# Patient Record
Sex: MALE | Race: WHITE | Employment: UNEMPLOYED | URBAN - METROPOLITAN AREA
[De-identification: names, ages, dates, MRNs, and addresses within clinical notes are randomized per-mention and may not be internally consistent; named-entity substitution may affect disease eponyms.]

---

## 2021-08-20 ENCOUNTER — OFFICE VISIT (OUTPATIENT)
Dept: FAMILY MEDICINE CLINIC | Facility: CLINIC | Age: 21
End: 2021-08-20
Payer: COMMERCIAL

## 2021-08-20 VITALS
SYSTOLIC BLOOD PRESSURE: 118 MMHG | DIASTOLIC BLOOD PRESSURE: 86 MMHG | HEIGHT: 64 IN | HEART RATE: 78 BPM | BODY MASS INDEX: 31.92 KG/M2 | WEIGHT: 187 LBS

## 2021-08-20 DIAGNOSIS — Z00.00 PHYSICAL EXAM: Primary | ICD-10-CM

## 2021-08-20 PROCEDURE — 3008F BODY MASS INDEX DOCD: CPT | Performed by: FAMILY MEDICINE

## 2021-08-20 PROCEDURE — 3079F DIAST BP 80-89 MM HG: CPT | Performed by: FAMILY MEDICINE

## 2021-08-20 PROCEDURE — 90715 TDAP VACCINE 7 YRS/> IM: CPT | Performed by: FAMILY MEDICINE

## 2021-08-20 PROCEDURE — 99385 PREV VISIT NEW AGE 18-39: CPT | Performed by: FAMILY MEDICINE

## 2021-08-20 PROCEDURE — 90471 IMMUNIZATION ADMIN: CPT | Performed by: FAMILY MEDICINE

## 2021-08-20 PROCEDURE — 3074F SYST BP LT 130 MM HG: CPT | Performed by: FAMILY MEDICINE

## 2021-08-20 NOTE — PROGRESS NOTES
8/20/2021  12:12 PM    Chanda Andujar is a 19year old male. Chief complaint(s): Patient presents with: Annual    HPI:     Chanda Andujar primary complaint is regarding CPE.        Chanda Andujar is a 19year old male is here for routine periodic health sc Patient discharged by [facility], [place]. Patient provided with discharge instructions Rx and instructions on follow up care. Patient out of ED ambulatory accompanied by self. change, fatigue and fever. HENT: Negative for hearing loss and nosebleeds. Eyes: Negative for pain and visual disturbance. Respiratory: Negative for apnea and shortness of breath.     Cardiovascular: Negative for chest pain, palpitations and leg swel Hernia: No hernia is present. Musculoskeletal:      Cervical back: Neck supple. Comments: Spinal exam without scoliosis. Lymphadenopathy:      Cervical: No cervical adenopathy. Skin:     General: Skin is warm. Findings: No rash.    Yovany File cardiovascular exercise. Encourage to maintain the best physical and dental hygiene possible.   Consider a  if overweight and/or having difficult in staying active; attempt to keep a schedule that includes an adequate sleep and physical exer

## 2021-09-10 ENCOUNTER — LAB ENCOUNTER (OUTPATIENT)
Dept: LAB | Facility: HOSPITAL | Age: 21
End: 2021-09-10
Attending: FAMILY MEDICINE
Payer: COMMERCIAL

## 2021-09-10 DIAGNOSIS — Z00.00 PHYSICAL EXAM: ICD-10-CM

## 2021-09-10 LAB
ALBUMIN SERPL-MCNC: 4.2 G/DL (ref 3.4–5)
ALBUMIN/GLOB SERPL: 1 {RATIO} (ref 1–2)
ALP LIVER SERPL-CCNC: 87 U/L
ALT SERPL-CCNC: 144 U/L
ANION GAP SERPL CALC-SCNC: 6 MMOL/L (ref 0–18)
AST SERPL-CCNC: 55 U/L (ref 15–37)
BASOPHILS # BLD AUTO: 0.04 X10(3) UL (ref 0–0.2)
BASOPHILS NFR BLD AUTO: 0.6 %
BILIRUB SERPL-MCNC: 0.6 MG/DL (ref 0.1–2)
BILIRUB UR QL: NEGATIVE
BUN BLD-MCNC: 14 MG/DL (ref 7–18)
BUN/CREAT SERPL: 23.7 (ref 10–20)
CALCIUM BLD-MCNC: 9.4 MG/DL (ref 8.5–10.1)
CHLORIDE SERPL-SCNC: 105 MMOL/L (ref 98–112)
CHOLEST SMN-MCNC: 215 MG/DL (ref ?–200)
CLARITY UR: CLEAR
CO2 SERPL-SCNC: 27 MMOL/L (ref 21–32)
COLOR UR: YELLOW
CREAT BLD-MCNC: 0.59 MG/DL
DEPRECATED RDW RBC AUTO: 41.3 FL (ref 35.1–46.3)
EOSINOPHIL # BLD AUTO: 0.12 X10(3) UL (ref 0–0.7)
EOSINOPHIL NFR BLD AUTO: 1.8 %
ERYTHROCYTE [DISTWIDTH] IN BLOOD BY AUTOMATED COUNT: 12.8 % (ref 11–15)
EST. AVERAGE GLUCOSE BLD GHB EST-MCNC: 131 MG/DL (ref 68–126)
GLOBULIN PLAS-MCNC: 4.2 G/DL (ref 2.8–4.4)
GLUCOSE BLD-MCNC: 87 MG/DL (ref 70–99)
GLUCOSE UR-MCNC: NEGATIVE MG/DL
HBA1C MFR BLD HPLC: 6.2 % (ref ?–5.7)
HCT VFR BLD AUTO: 47.8 %
HDLC SERPL-MCNC: 38 MG/DL (ref 40–59)
HGB BLD-MCNC: 16.3 G/DL
HGB UR QL STRIP.AUTO: NEGATIVE
IMM GRANULOCYTES # BLD AUTO: 0.02 X10(3) UL (ref 0–1)
IMM GRANULOCYTES NFR BLD: 0.3 %
KETONES UR-MCNC: NEGATIVE MG/DL
LDLC SERPL CALC-MCNC: 139 MG/DL (ref ?–100)
LEUKOCYTE ESTERASE UR QL STRIP.AUTO: NEGATIVE
LYMPHOCYTES # BLD AUTO: 1.87 X10(3) UL (ref 1–4)
LYMPHOCYTES NFR BLD AUTO: 27.3 %
M PROTEIN MFR SERPL ELPH: 8.4 G/DL (ref 6.4–8.2)
MCH RBC QN AUTO: 30 PG (ref 26–34)
MCHC RBC AUTO-ENTMCNC: 34.1 G/DL (ref 31–37)
MCV RBC AUTO: 88 FL
MONOCYTES # BLD AUTO: 0.72 X10(3) UL (ref 0.1–1)
MONOCYTES NFR BLD AUTO: 10.5 %
NEUTROPHILS # BLD AUTO: 4.08 X10 (3) UL (ref 1.5–7.7)
NEUTROPHILS # BLD AUTO: 4.08 X10(3) UL (ref 1.5–7.7)
NEUTROPHILS NFR BLD AUTO: 59.5 %
NITRITE UR QL STRIP.AUTO: NEGATIVE
NONHDLC SERPL-MCNC: 177 MG/DL (ref ?–130)
OSMOLALITY SERPL CALC.SUM OF ELEC: 286 MOSM/KG (ref 275–295)
PATIENT FASTING Y/N/NP: YES
PATIENT FASTING Y/N/NP: YES
PH UR: 6 [PH] (ref 5–8)
PLATELET # BLD AUTO: 200 10(3)UL (ref 150–450)
POTASSIUM SERPL-SCNC: 4 MMOL/L (ref 3.5–5.1)
PROT UR-MCNC: NEGATIVE MG/DL
RBC # BLD AUTO: 5.43 X10(6)UL
SODIUM SERPL-SCNC: 138 MMOL/L (ref 136–145)
SP GR UR STRIP: 1.02 (ref 1–1.03)
TRIGL SERPL-MCNC: 208 MG/DL (ref 30–149)
TSI SER-ACNC: 1.04 MIU/ML (ref 0.36–3.74)
UROBILINOGEN UR STRIP-ACNC: <2
VIT D+METAB SERPL-MCNC: 12.9 NG/ML (ref 30–100)
VLDLC SERPL CALC-MCNC: 39 MG/DL (ref 0–30)
WBC # BLD AUTO: 6.9 X10(3) UL (ref 4–11)

## 2021-09-10 PROCEDURE — 81015 MICROSCOPIC EXAM OF URINE: CPT | Performed by: FAMILY MEDICINE

## 2021-09-10 PROCEDURE — 81001 URINALYSIS AUTO W/SCOPE: CPT | Performed by: FAMILY MEDICINE

## 2021-09-10 PROCEDURE — 83036 HEMOGLOBIN GLYCOSYLATED A1C: CPT

## 2021-09-10 PROCEDURE — 85025 COMPLETE CBC W/AUTO DIFF WBC: CPT

## 2021-09-10 PROCEDURE — 84443 ASSAY THYROID STIM HORMONE: CPT

## 2021-09-10 PROCEDURE — 82306 VITAMIN D 25 HYDROXY: CPT | Performed by: FAMILY MEDICINE

## 2021-09-10 PROCEDURE — 80053 COMPREHEN METABOLIC PANEL: CPT

## 2021-09-10 PROCEDURE — 80061 LIPID PANEL: CPT

## 2021-09-10 PROCEDURE — 36415 COLL VENOUS BLD VENIPUNCTURE: CPT

## 2021-09-10 RX ORDER — ERGOCALCIFEROL 1.25 MG/1
50000 CAPSULE ORAL WEEKLY
Qty: 12 CAPSULE | Refills: 4 | Status: SHIPPED | OUTPATIENT
Start: 2021-09-10 | End: 2021-10-10

## 2022-01-04 ENCOUNTER — OFFICE VISIT (OUTPATIENT)
Dept: FAMILY MEDICINE CLINIC | Facility: CLINIC | Age: 22
End: 2022-01-04
Payer: COMMERCIAL

## 2022-01-04 VITALS
BODY MASS INDEX: 32.78 KG/M2 | SYSTOLIC BLOOD PRESSURE: 130 MMHG | HEART RATE: 77 BPM | WEIGHT: 192 LBS | DIASTOLIC BLOOD PRESSURE: 80 MMHG | HEIGHT: 64 IN

## 2022-01-04 DIAGNOSIS — R04.0 EPISTAXIS: Primary | ICD-10-CM

## 2022-01-04 PROCEDURE — 99213 OFFICE O/P EST LOW 20 MIN: CPT | Performed by: NURSE PRACTITIONER

## 2022-01-04 PROCEDURE — 3075F SYST BP GE 130 - 139MM HG: CPT | Performed by: NURSE PRACTITIONER

## 2022-01-04 PROCEDURE — 3008F BODY MASS INDEX DOCD: CPT | Performed by: NURSE PRACTITIONER

## 2022-01-04 PROCEDURE — 3079F DIAST BP 80-89 MM HG: CPT | Performed by: NURSE PRACTITIONER

## 2022-01-04 RX ORDER — ECHINACEA PURPUREA EXTRACT 125 MG
1 TABLET ORAL AS NEEDED
Qty: 60 ML | Refills: 2 | Status: SHIPPED | OUTPATIENT
Start: 2022-01-04

## 2022-01-04 NOTE — PATIENT INSTRUCTIONS
Nosebleed (Adult)    Bleeding from the nose most commonly occurs because of injury or drying and cracking of the inner lining of the nose. Most nosebleeds are because of dry air or nose-picking. They can occur during a common cold or an allergy attack. Pinch your nose tightly on both sides, as shown above, for 10 to 15 minutes. Time yourself. Don’t release the pressure on your nose until 10 minutes is up.  If bleeding doesn't stop, continue to pinch your nose and, if the bleeding is a small amount, call y

## 2022-01-04 NOTE — PROGRESS NOTES
HPI    Patient presents for concerns of recurrent nose bleeds x 2 weeks. Patient is not using any nose sprays. Review of Systems   HENT: Positive for nosebleeds.           01/04/22  1346   BP: 130/80   Pulse: 77   Weight: 192 lb (87.1 kg)   Height is not in acute distress. Appearance: Normal appearance. He is not ill-appearing, toxic-appearing or diaphoretic. HENT:      Head: Normocephalic and atraumatic.       Right Ear: Tympanic membrane, ear canal and external ear normal. There is no impacte

## 2025-02-05 ENCOUNTER — LAB ENCOUNTER (OUTPATIENT)
Dept: LAB | Age: 25
End: 2025-02-05
Attending: NURSE PRACTITIONER
Payer: COMMERCIAL

## 2025-02-05 ENCOUNTER — OFFICE VISIT (OUTPATIENT)
Dept: FAMILY MEDICINE CLINIC | Facility: CLINIC | Age: 25
End: 2025-02-05

## 2025-02-05 VITALS
WEIGHT: 210.31 LBS | HEART RATE: 79 BPM | TEMPERATURE: 97 F | SYSTOLIC BLOOD PRESSURE: 134 MMHG | HEIGHT: 65 IN | DIASTOLIC BLOOD PRESSURE: 88 MMHG | BODY MASS INDEX: 35.04 KG/M2

## 2025-02-05 DIAGNOSIS — R74.8 ELEVATED LIVER ENZYMES: ICD-10-CM

## 2025-02-05 DIAGNOSIS — Z83.3 FAMILY HISTORY OF DIABETES MELLITUS IN FATHER: ICD-10-CM

## 2025-02-05 DIAGNOSIS — R73.03 PRE-DIABETES: ICD-10-CM

## 2025-02-05 DIAGNOSIS — E78.2 MIXED HYPERLIPIDEMIA: ICD-10-CM

## 2025-02-05 DIAGNOSIS — Z00.00 WELL ADULT EXAM: Primary | ICD-10-CM

## 2025-02-05 DIAGNOSIS — Z23 IMMUNIZATION DUE: ICD-10-CM

## 2025-02-05 DIAGNOSIS — R03.0 ELEVATED BLOOD-PRESSURE READING WITHOUT DIAGNOSIS OF HYPERTENSION: ICD-10-CM

## 2025-02-05 DIAGNOSIS — K21.9 GASTROESOPHAGEAL REFLUX DISEASE WITHOUT ESOPHAGITIS: ICD-10-CM

## 2025-02-05 DIAGNOSIS — E66.9 OBESITY (BMI 30-39.9): ICD-10-CM

## 2025-02-05 DIAGNOSIS — Z00.00 WELL ADULT EXAM: ICD-10-CM

## 2025-02-05 LAB
ALBUMIN SERPL-MCNC: 5.3 G/DL (ref 3.2–4.8)
ALBUMIN/GLOB SERPL: 1.7 {RATIO} (ref 1–2)
ALP LIVER SERPL-CCNC: 109 U/L
ALT SERPL-CCNC: 189 U/L
ANION GAP SERPL CALC-SCNC: 10 MMOL/L (ref 0–18)
AST SERPL-CCNC: 73 U/L (ref ?–34)
BILIRUB SERPL-MCNC: 0.3 MG/DL (ref 0.3–1.2)
BUN BLD-MCNC: 11 MG/DL (ref 9–23)
BUN/CREAT SERPL: 15.3 (ref 10–20)
CALCIUM BLD-MCNC: 9.8 MG/DL (ref 8.7–10.4)
CHLORIDE SERPL-SCNC: 101 MMOL/L (ref 98–112)
CHOLEST SERPL-MCNC: 211 MG/DL (ref ?–200)
CO2 SERPL-SCNC: 26 MMOL/L (ref 21–32)
CREAT BLD-MCNC: 0.72 MG/DL
CREAT UR-SCNC: 58.7 MG/DL
DEPRECATED RDW RBC AUTO: 40 FL (ref 35.1–46.3)
EGFRCR SERPLBLD CKD-EPI 2021: 129 ML/MIN/1.73M2 (ref 60–?)
ERYTHROCYTE [DISTWIDTH] IN BLOOD BY AUTOMATED COUNT: 12.5 % (ref 11–15)
EST. AVERAGE GLUCOSE BLD GHB EST-MCNC: 148 MG/DL (ref 68–126)
FASTING PATIENT LIPID ANSWER: YES
FASTING STATUS PATIENT QL REPORTED: YES
GLOBULIN PLAS-MCNC: 3.1 G/DL (ref 2–3.5)
GLUCOSE BLD-MCNC: 105 MG/DL (ref 70–99)
HBA1C MFR BLD: 6.8 % (ref ?–5.7)
HCT VFR BLD AUTO: 49.3 %
HDLC SERPL-MCNC: 30 MG/DL (ref 40–59)
HGB BLD-MCNC: 17 G/DL
INSULIN SERPL-ACNC: 38.8 MU/L (ref 3–25)
LDLC SERPL CALC-MCNC: 97 MG/DL (ref ?–100)
MCH RBC QN AUTO: 30.2 PG (ref 26–34)
MCHC RBC AUTO-ENTMCNC: 34.5 G/DL (ref 31–37)
MCV RBC AUTO: 87.6 FL
MICROALBUMIN UR-MCNC: 4.5 MG/DL
MICROALBUMIN/CREAT 24H UR-RTO: 76.7 UG/MG (ref ?–30)
NONHDLC SERPL-MCNC: 181 MG/DL (ref ?–130)
OSMOLALITY SERPL CALC.SUM OF ELEC: 284 MOSM/KG (ref 275–295)
PLATELET # BLD AUTO: 215 10(3)UL (ref 150–450)
POTASSIUM SERPL-SCNC: 4.1 MMOL/L (ref 3.5–5.1)
PROT SERPL-MCNC: 8.4 G/DL (ref 5.7–8.2)
RBC # BLD AUTO: 5.63 X10(6)UL
SODIUM SERPL-SCNC: 137 MMOL/L (ref 136–145)
TRIGL SERPL-MCNC: 500 MG/DL (ref 30–149)
TSI SER-ACNC: 1.73 UIU/ML (ref 0.55–4.78)
VIT B12 SERPL-MCNC: 803 PG/ML (ref 211–911)
VIT D+METAB SERPL-MCNC: 6.6 NG/ML (ref 30–100)
VLDLC SERPL CALC-MCNC: 85 MG/DL (ref 0–30)
WBC # BLD AUTO: 7.5 X10(3) UL (ref 4–11)

## 2025-02-05 PROCEDURE — 82607 VITAMIN B-12: CPT

## 2025-02-05 PROCEDURE — 36415 COLL VENOUS BLD VENIPUNCTURE: CPT

## 2025-02-05 PROCEDURE — 82306 VITAMIN D 25 HYDROXY: CPT

## 2025-02-05 PROCEDURE — 85027 COMPLETE CBC AUTOMATED: CPT

## 2025-02-05 PROCEDURE — 82570 ASSAY OF URINE CREATININE: CPT

## 2025-02-05 PROCEDURE — 82043 UR ALBUMIN QUANTITATIVE: CPT

## 2025-02-05 PROCEDURE — 80061 LIPID PANEL: CPT

## 2025-02-05 PROCEDURE — 80053 COMPREHEN METABOLIC PANEL: CPT

## 2025-02-05 PROCEDURE — 83036 HEMOGLOBIN GLYCOSYLATED A1C: CPT

## 2025-02-05 PROCEDURE — 84443 ASSAY THYROID STIM HORMONE: CPT

## 2025-02-05 PROCEDURE — 83525 ASSAY OF INSULIN: CPT

## 2025-02-05 RX ORDER — GUAIFENESIN 600 MG/1
600 TABLET, EXTENDED RELEASE ORAL
COMMUNITY
Start: 2025-01-31

## 2025-02-05 RX ORDER — OMEPRAZOLE 40 MG/1
40 CAPSULE, DELAYED RELEASE ORAL DAILY
Qty: 90 CAPSULE | Refills: 1 | Status: SHIPPED | OUTPATIENT
Start: 2025-02-05

## 2025-02-05 RX ORDER — OSELTAMIVIR PHOSPHATE 75 MG/1
75 CAPSULE ORAL 2 TIMES DAILY
COMMUNITY
Start: 2025-01-31

## 2025-02-05 NOTE — ASSESSMENT & PLAN NOTE
OT ACUTE Treatment Session    Pt seen on 10 nursing unit.                                                          Frequency Comments: [facility]     Admitting complaint:: Stroke (cerebrum) (CMS/[facility]) [I63.9]                                                                                         Precautions  Other Precautions: fall risk (07/06/20 0801)  Precautions Comments: R hemiparesis, hypertonicity (07/03/20 0947)      ASSESSMENT:  Treatment today focused on functional mobility, RUE neuro re-education.  Current functional mobility for ADL and Instrumental-ADL tasks is min A for sit<>stand and short side stepping to right side.  Patient  displays  fair progress demonstrated by increased participation.   Limitations at this time include weakness, fatigue, cognitive deficits, balance, pain, medical status, carryover of techniques taught from one session to the next and RUE hypertonicity. Patient will benefit from further skilled OT for continued training with noted deficits to help the patient meet goal of noted deficits.  Discussed patient goal of decreased pain, discharge planning options and therapy progress made to date with Patient.       RECOMMENDATIONS FOR DISCHARGE:  Recommendations for Discharge: OT WI: Post acute therapy (07/08/20 1255)    OT Identified Barriers to Discharge: ADL dysfunction, impaired balance and mobility, impaired cognition, R sided hypertonicity     PT/OT Mobility Equipment for Discharge: Will continue to monitor needs; unsure of which devices pt has. (07/06/20 0801)  PT/OT ADL Equipment for Discharge: tbd (07/08/20 1255)    Assistance needed when returning home:   Not discussed at this time due to discharge plan/needs not established.  Will continue to address as hospital stay progresses.     ICU Mobility Assesment (PERME):         PLAN: Continue skilled OT, including the following Treatment Interventions: ADL retraining;Functional transfer training;UE strengthening/ROM;Endurance  Recheck cmp  Please aim to eat a diet high in fresh fruits and vegetables, lean protein sources, complex carbohydrates and limited processed and fast foods.  Try to get at least 150 minutes of exercise per week-a combination of weight resistance and cardio is preferred.    Pt denies alcohol use   training;Cognitive reorientation;Patient/Family training;Equipment eval/education;Neuro muscular reeducation;Fine motor coordination activities;Compensatory technique education (07/06/20 1425)   Treatment Plan for Next Session: UE bathing seated, use of lianna cane with additional assist for mobility  Additional Plan Considerations: Pt. reports having sling and splint at home but does not like to wear       EDUCATION:   On this date, the patient was educated on RUE stretching, pain management strategies.    The response to education was: Needs reinforcement                                                    SUBJECTIVE:     Subjective: Pt agreeable to session with gentle encouragement. Continued emotional lability throughout session.  (07/08/20 1255)  Subjective/Objective Comments: RN ok'd session. End of session Pt in bed with alarm activated and needs met. Call light within reach.  (07/08/20 1255)    OBJECTIVE:Safety Measures: Alarms(bed alarm zone) (07/08/20 1255)    RN reported [place] Fall Scale Score: 85       Last 24 hours of Functional Data     ADLs       Household mobility  Household Mobility  Supine to Sit: Supervision (07/08/20 1255)  Sit to Supine: Supervision (07/08/20 1255)  Sit to Stand: Minimal Assist (Min) (07/08/20 1255)  Stand to Sit: Minimal Assist (Min) (07/08/20 1255)  Transfer Equipment: gait belt, 2ww (07/08/20 1255)  Sitting - Static: Supervision (07/08/20 1255)  Sitting - Dynamic: Touching/Steadying Assistance (07/08/20 1255)  Standing - Static: Minimal Assist (Min) (07/08/20 1255)  Household Mobility Comments #1: Pt able to side step towards HOB with min A. Cues to keep eyes open with ambulation as Pt crying throughout activity.  (07/08/20 1255)    Home Management       Other Interventions  Other Interventions 1: Unsupported sitting: scapular mobilization exercises with 1 set x6-10 reps (varied based on Pt's emotional lability)/ Sustained Passive stretch to Right wrist extensors, finger  extensors, elbow extensors, shoulder external rotators and pronators/supinators with gentle AAROM. No skin breakdown noted in Right palm. Replaced towel roll with CNA education on purpose of placement and skin checks.  (07/08/20 1255)      Tolerance  OT Activity Tolerance  Activity Tolerance: 1:1 Activity to rest (07/08/20 1255)  Activity Tolerance Comments: fair, extra time needed for emotional status vs. activity tolerance (07/08/20 1255)    Cognition       Patient's Personal Goal: none stated (07/05/20 1601)    Therapy Goals:    Goals  Short Term Goals to Be Reviewed On: 07/10/20 (07/03/20 0947)  Short Term Goals Are The Same as Discharge Goals: Yes (07/03/20 0947)  Goal Agreement: Patient agrees with goals and treatment plan (07/03/20 0947)  Grooming Discharge Goal 1: three grooming tasks supervision (07/03/20 0947)  Bathing Discharge Goal 1: full body bathing supervision (07/03/20 0947)  Dressing Short Term Goal 1: full body dressing supervision (07/03/20 0947)  Toileting Discharge Goal 1: toilet supervision (07/03/20 0947)  Home Setting Transfer Short Term Goal 2: toilet transfer supervision (07/03/20 0947)  Home Management Discharge Goal 1: tub transfer supervision (07/03/20 0947)        Total Treatment Time:  OT Time Spent: 45 minutes (07/08/20 1255)    See OT flowsheet for full details regarding the OT therapy provided.

## 2025-02-05 NOTE — ASSESSMENT & PLAN NOTE
I recommend that you try/continue to decrease the amount of carbohydrates that you ingest (bread products, rice, pasta, potatoes, cereals, starchy vegetables and high sugar containing foods and  beverages). Also try to increase the amount of vegetables and protein that you eat daily.  Decrease the amount of processed and fast foods. Try to exercise at least 30 minutes per day, 4-5 times per week, combination of cardio and weight training.     Encourage weight loss

## 2025-02-05 NOTE — ASSESSMENT & PLAN NOTE
Check diabetic labs  I recommend that you try/continue to decrease the amount of carbohydrates that you ingest (bread products, rice, pasta, potatoes, cereals, starchy vegetables and high sugar containing foods and  beverages). Also try to increase the amount of vegetables and protein that you eat daily.  Decrease the amount of processed and fast foods. Try to exercise at least 30 minutes per day, 4-5 times per week, combination of cardio and weight training.

## 2025-02-05 NOTE — PROGRESS NOTES
HPI  Pt presents for annual physical .   Notices problems w breathing after eating alondra sweet, spicy or acidic foods. Notices some reflux.     Has not had physical  since 2021-hga1c 6.2 at that time    Diet-fair-tries to eat a salad at work daily  Exercise-walks 3-4 miles a day at work.   Sleep-well rested      Family medical [id number], father- dm and cholesterol    No smoking or vaping, alcohol or cannabis.     Review of Systems   Constitutional:  Negative for activity change, appetite change and fever.   HENT:  Negative for congestion, ear pain, rhinorrhea, sore throat and trouble swallowing.    Eyes:  Negative for pain, redness and visual disturbance.   Respiratory:  Positive for shortness of breath. Negative for cough, chest tightness and wheezing.    Cardiovascular:  Negative for chest pain and palpitations.   Gastrointestinal:  Negative for abdominal distention, abdominal pain, constipation, diarrhea, nausea and vomiting.        + reflux after eating   Genitourinary:  Negative for difficulty urinating, dysuria and frequency.   Musculoskeletal:  Negative for back pain, gait problem and myalgias.   Skin:  Negative for color change and rash.   Neurological:  Negative for dizziness, weakness and headaches.   Psychiatric/Behavioral:  Negative for behavioral problems and dysphoric mood. The patient is not nervous/anxious.        Vitals:    02/05/25 0928 02/05/25 0951   BP: (!) 137/92 134/88   Pulse: 79    Temp: 97 °F (36.1 °C)    Weight: 210 lb 4.8 oz (95.4 kg)    Height: 5' 5\" (1.651 m)      Body mass index is 35 kg/m².  Wt Readings from Last 6 Encounters:   02/05/25 210 lb 4.8 oz (95.4 kg)   01/04/22 192 lb (87.1 kg)   08/20/21 187 lb (84.8 kg)     BP Readings from Last 5 Encounters:   02/05/25 134/88   01/04/22 130/80   08/20/21 118/86         Health Maintenance   Topic Date Due    Annual Physical  Never done    HPV Vaccines (2 - Male 3-dose series) 03/14/2014    COVID-19 Vaccine (3 - 2024-25 season)  09/01/2024    Influenza Vaccine (1) 10/01/2024    Annual Depression Screening  Never done    DTaP,Tdap,and Td Vaccines (3 - Td or Tdap) 08/20/2031    Pneumococcal Vaccine: Birth to 50yrs  Aged Out    Meningococcal B Vaccine  Aged Out       History reviewed. No pertinent past medical history.    .History reviewed. No pertinent surgical history.    Family History   Problem Relation Age of Onset    Lipids Father     No Known Problems Mother        Social History     Socioeconomic History    Marital status: Single     Spouse name: Not on file    Number of children: Not on file    Years of education: Not on file    Highest education level: Not on file   Occupational History    Not on file   Tobacco Use    Smoking status: Never    Smokeless tobacco: Never   Substance and Sexual Activity    Alcohol use: Never    Drug use: Never    Sexual activity: Not on file   Other Topics Concern    Not on file   Social History Narrative    Not on file     Social Drivers of Health     Food Insecurity: No Food Insecurity (2/5/2025)    NCSS - Food Insecurity     Worried About Running Out of Food in the Last Year: No     Ran Out of Food in the Last Year: No   Transportation Needs: No Transportation Needs (2/5/2025)    NCSS - Transportation     Lack of Transportation: No   Stress: Not on file   Housing Stability: Not At Risk (2/5/2025)    NCSS - Housing/Utilities     Has Housing: Yes     Worried About Losing Housing: No     Unable to Get Utilities: No       Current Outpatient Medications   Medication Sig Dispense Refill    guaiFENesin  MG Oral Tablet 12 Hr Take 1 tablet (600 mg total) by mouth.      oseltamivir 75 MG Oral Cap Take 1 capsule (75 mg total) by mouth 2 (two) times daily.      Omeprazole 40 MG Oral Capsule Delayed Release Take 1 capsule (40 mg total) by mouth daily. 90 capsule 1    sodium chloride 0.65 % Nasal Solution 1 spray by Nasal route as needed for congestion. (Patient not taking: Reported on 2/5/2025) 60 mL 2        Allergies:  Allergies[1]    Physical Exam  Vitals and nursing note reviewed.   Constitutional:       General: He is not in acute distress.     Appearance: Normal appearance. He is well-developed. He is obese.   HENT:      Head: Normocephalic and atraumatic.      Right Ear: Tympanic membrane, ear canal and external ear normal.      Left Ear: Tympanic membrane, ear canal and external ear normal.      Nose: Nose normal. No congestion or rhinorrhea.      Mouth/Throat:      Mouth: Mucous membranes are moist.      Pharynx: Oropharynx is clear. No oropharyngeal exudate or posterior oropharyngeal erythema.   Eyes:      General:         Right eye: No discharge.         Left eye: No discharge.      Conjunctiva/sclera: Conjunctivae normal.      Pupils: Pupils are equal, round, and reactive to light.   Neck:      Thyroid: No thyromegaly.   Cardiovascular:      Rate and Rhythm: Normal rate and regular rhythm.      Heart sounds: Normal heart sounds. No murmur heard.  Pulmonary:      Effort: Pulmonary effort is normal. No respiratory distress.      Breath sounds: Normal breath sounds. No wheezing or rales.   Chest:      Chest wall: No tenderness.   Abdominal:      General: Bowel sounds are normal. There is no distension.      Palpations: Abdomen is soft.      Tenderness: There is no abdominal tenderness.   Musculoskeletal:         General: No tenderness. Normal range of motion.      Cervical back: Normal range of motion and neck supple.   Lymphadenopathy:      Cervical: No cervical adenopathy.   Skin:     General: Skin is warm and dry.      Findings: No rash.   Neurological:      Mental Status: He is alert and oriented to person, place, and time.      Coordination: Coordination normal.      Gait: Gait normal.   Psychiatric:         Behavior: Behavior normal.         Thought Content: Thought content normal.         Judgment: Judgment normal.         Assessment and Plan:   Problem List Items Addressed This Visit       Elevated  blood-pressure reading without diagnosis of hypertension     Will monitor blood pressure   Check labs  Please aim to eat a diet high in fresh fruits and vegetables, lean protein sources, complex carbohydrates and limited processed and fast foods.  Try to get at least 150 minutes of exercise per week-a combination of weight resistance and cardio is preferred.             Elevated liver enzymes     Recheck cmp  Please aim to eat a diet high in fresh fruits and vegetables, lean protein sources, complex carbohydrates and limited processed and fast foods.  Try to get at least 150 minutes of exercise per week-a combination of weight resistance and cardio is preferred.    Pt denies alcohol use         Relevant Medications    oseltamivir 75 MG Oral Cap    Omeprazole 40 MG Oral Capsule Delayed Release    Other Relevant Orders    Comp Metabolic Panel (14)    Family history of diabetes mellitus in father     Check diabetic labs  I recommend that you try/continue to decrease the amount of carbohydrates that you ingest (bread products, rice, pasta, potatoes, cereals, starchy vegetables and high sugar containing foods and  beverages). Also try to increase the amount of vegetables and protein that you eat daily.  Decrease the amount of processed and fast foods. Try to exercise at least 30 minutes per day, 4-5 times per week, combination of cardio and weight training.              Gastroesophageal reflux disease without esophagitis     Omeprazole 40 mg daily  Discussed diet and positioning  Please call if symptoms worsen or are not resolving.           Relevant Medications    Omeprazole 40 MG Oral Capsule Delayed Release    Immunization due     Influenza and hpv #2  Follow up 4 months hpv #3         Relevant Medications    oseltamivir 75 MG Oral Cap    Other Relevant Orders    HPV HUMAN PAPILLOMA VIRUS VACC 9 CORKY 3 DOSE IM    INFLUENZA VACCINE, TRI, PRESERV FREE, 0.5 ML    Mixed hyperlipidemia     Please aim to eat a diet high in  fresh fruits and vegetables, lean protein sources, complex carbohydrates and limited processed and fast foods.  Try to get at least 150 minutes of exercise per week-a combination of weight resistance and cardio is preferred.    Check lipids         Relevant Orders    Comp Metabolic Panel (14)    Lipid Panel    Obesity (BMI 30-39.9)     I recommend that you try/continue to decrease the amount of carbohydrates that you ingest (bread products, rice, pasta, potatoes, cereals, starchy vegetables and high sugar containing foods and  beverages). Also try to increase the amount of vegetables and protein that you eat daily.  Decrease the amount of processed and fast foods. Try to exercise at least 30 minutes per day, 4-5 times per week, combination of cardio and weight training.     Encourage weight loss          Pre-diabetes     Check diabetic labs  I recommend that you try/continue to decrease the amount of carbohydrates that you ingest (bread products, rice, pasta, potatoes, cereals, starchy vegetables and high sugar containing foods and  beverages). Also try to increase the amount of vegetables and protein that you eat daily.  Decrease the amount of processed and fast foods. Try to exercise at least 30 minutes per day, 4-5 times per week, combination of cardio and weight training.              Relevant Orders    Comp Metabolic Panel (14)    Hemoglobin A1C    Insulin    Microalb/Creat Ratio, Random Urine    Well adult exam - Primary     Screening labs  Please aim to eat a diet high in fresh fruits and vegetables, lean protein sources, complex carbohydrates and limited processed and fast foods.  Try to get at least 150 minutes of exercise per week-a combination of weight resistance and cardio is preferred.    Flu and hpv vaccine today           Relevant Orders    CBC, Platelet; No Differential    Comp Metabolic Panel (14)    Hemoglobin A1C    Insulin    Lipid Panel    TSH W Reflex To Free T4    Vitamin B12    Vitamin D                Discussed plan of care with pt and pt is in agreement.All questions answered. Pt to call with questions or concerns.      Encouraged to sign up for My Chart if not already registered.     Note to patient and family:  The 21st Century Cures Act makes medical notes available to patients in the interest of transparency.  However, please be advised that this is a medical document.  It is intended as a peer to peer communication.  It is written in medical language and may contain abbreviations or verbiage that are technical and unfamiliar.  It may appear blunt or direct.  Medical documents are intended to carry relevant information, facts as evident, and the clinical opinion of the practitioner.         [1] No Known Allergies

## 2025-02-05 NOTE — ASSESSMENT & PLAN NOTE
Omeprazole 40 mg daily  Discussed diet and positioning  Please call if symptoms worsen or are not resolving.

## 2025-02-05 NOTE — ASSESSMENT & PLAN NOTE
Screening labs  Please aim to eat a diet high in fresh fruits and vegetables, lean protein sources, complex carbohydrates and limited processed and fast foods.  Try to get at least 150 minutes of exercise per week-a combination of weight resistance and cardio is preferred.    Flu and hpv vaccine today

## 2025-02-05 NOTE — ASSESSMENT & PLAN NOTE
Will monitor blood pressure   Check labs  Please aim to eat a diet high in fresh fruits and vegetables, lean protein sources, complex carbohydrates and limited processed and fast foods.  Try to get at least 150 minutes of exercise per week-a combination of weight resistance and cardio is preferred.

## 2025-02-11 ENCOUNTER — OFFICE VISIT (OUTPATIENT)
Dept: FAMILY MEDICINE CLINIC | Facility: CLINIC | Age: 25
End: 2025-02-11
Payer: COMMERCIAL

## 2025-02-11 VITALS
BODY MASS INDEX: 34.22 KG/M2 | HEART RATE: 78 BPM | WEIGHT: 205.38 LBS | HEIGHT: 65 IN | TEMPERATURE: 97 F | DIASTOLIC BLOOD PRESSURE: 80 MMHG | SYSTOLIC BLOOD PRESSURE: 130 MMHG

## 2025-02-11 DIAGNOSIS — E78.2 MIXED HYPERLIPIDEMIA: ICD-10-CM

## 2025-02-11 DIAGNOSIS — E55.9 VITAMIN D DEFICIENCY: ICD-10-CM

## 2025-02-11 DIAGNOSIS — E11.65 TYPE 2 DIABETES MELLITUS WITH HYPERGLYCEMIA, WITHOUT LONG-TERM CURRENT USE OF INSULIN (HCC): Primary | ICD-10-CM

## 2025-02-11 DIAGNOSIS — R74.8 ELEVATED LIVER ENZYMES: ICD-10-CM

## 2025-02-11 PROBLEM — R03.0 ELEVATED BLOOD-PRESSURE READING WITHOUT DIAGNOSIS OF HYPERTENSION: Status: RESOLVED | Noted: 2025-02-05 | Resolved: 2025-02-11

## 2025-02-11 PROBLEM — R73.03 PRE-DIABETES: Status: RESOLVED | Noted: 2025-02-05 | Resolved: 2025-02-11

## 2025-02-11 PROCEDURE — 3008F BODY MASS INDEX DOCD: CPT | Performed by: NURSE PRACTITIONER

## 2025-02-11 PROCEDURE — 3061F NEG MICROALBUMINURIA REV: CPT | Performed by: NURSE PRACTITIONER

## 2025-02-11 PROCEDURE — 3044F HG A1C LEVEL LT 7.0%: CPT | Performed by: NURSE PRACTITIONER

## 2025-02-11 PROCEDURE — 3060F POS MICROALBUMINURIA REV: CPT | Performed by: NURSE PRACTITIONER

## 2025-02-11 PROCEDURE — 3075F SYST BP GE 130 - 139MM HG: CPT | Performed by: NURSE PRACTITIONER

## 2025-02-11 PROCEDURE — 3079F DIAST BP 80-89 MM HG: CPT | Performed by: NURSE PRACTITIONER

## 2025-02-11 PROCEDURE — 99214 OFFICE O/P EST MOD 30 MIN: CPT | Performed by: NURSE PRACTITIONER

## 2025-02-11 RX ORDER — OMEGA-3-ACID ETHYL ESTERS 1 G/1
CAPSULE, LIQUID FILLED ORAL
Qty: 360 CAPSULE | Refills: 3 | Status: SHIPPED | OUTPATIENT
Start: 2025-02-11

## 2025-02-11 RX ORDER — FENOFIBRATE 160 MG/1
160 TABLET ORAL DAILY
Qty: 90 TABLET | Refills: 3 | Status: SHIPPED | OUTPATIENT
Start: 2025-02-11

## 2025-02-11 RX ORDER — LISINOPRIL 5 MG/1
5 TABLET ORAL DAILY
Qty: 90 TABLET | Refills: 3 | Status: SHIPPED | OUTPATIENT
Start: 2025-02-11

## 2025-02-11 RX ORDER — METFORMIN HYDROCHLORIDE 500 MG/1
500 TABLET, EXTENDED RELEASE ORAL DAILY
Qty: 90 TABLET | Refills: 1 | Status: SHIPPED | OUTPATIENT
Start: 2025-02-11

## 2025-02-11 RX ORDER — FOLIC ACID 1 MG/1
50000 TABLET ORAL WEEKLY
Qty: 12 CAPSULE | Refills: 3 | Status: SHIPPED | OUTPATIENT
Start: 2025-02-11

## 2025-02-11 RX ORDER — DIPHENHYDRAMINE HYDROCHLORIDE 25 MG/1
CAPSULE, LIQUID FILLED ORAL
Qty: 1 KIT | Refills: 0 | Status: SHIPPED | OUTPATIENT
Start: 2025-02-11

## 2025-02-11 RX ORDER — ROSUVASTATIN CALCIUM 10 MG/1
10 TABLET, COATED ORAL NIGHTLY
Qty: 90 TABLET | Refills: 1 | Status: SHIPPED | OUTPATIENT
Start: 2025-02-11

## 2025-02-11 NOTE — ASSESSMENT & PLAN NOTE
I recommend that you try/continue to decrease the amount of carbohydrates that you ingest (bread products, rice, pasta, potatoes, cereals, starchy vegetables and high sugar containing foods and  beverages). Also try to increase the amount of vegetables and protein that you eat daily.  Decrease the amount of processed and fast foods. Try to exercise at least 30 minutes per day, 4-5 times per week, combination of cardio and weight training.     Limit use of tylenol and alcoholic drinks    Ultrasound liver

## 2025-02-11 NOTE — ASSESSMENT & PLAN NOTE
Refer diabetic education  Start metformin er 500 mg daily  Start jardiance 10 mg daily  Check blood sugar in am and 2 hrs after a meal  Add lisinopril 5 mg daily  Add crestor 10 mg nightly  Follow up labs in 6 weeks and appointment in 7 weeks  Please let me know if you have any questions or concerns.

## 2025-02-11 NOTE — ASSESSMENT & PLAN NOTE
Crestor 10 mg nightly  Fenofibrate 160 mg daily  Vascepa 1 gm 2 caps twice a day  I recommend that you try/continue to decrease the amount of carbohydrates that you ingest (bread products, rice, pasta, potatoes, cereals, starchy vegetables and high sugar containing foods and  beverages). Also try to increase the amount of vegetables and protein that you eat daily.  Decrease the amount of processed and fast foods. Try to exercise at least 30 minutes per day, 4-5 times per week, combination of cardio and weight training.

## 2025-02-11 NOTE — PROGRESS NOTES
HPI  Pt here for follow up labs-diabetes.     Family medical history-grandmother  of diabetes in [place] at age 54    Review of Systems    Vitals:    25 1354   BP: 130/80   Pulse: 78   Temp: 97 °F (36.1 °C)   Weight: 205 lb 6.4 oz (93.2 kg)   Height: 5' 5\" (1.651 m)     Body mass index is 34.18 kg/m².  Wt Readings from Last 6 Encounters:   25 205 lb 6.4 oz (93.2 kg)   25 210 lb 4.8 oz (95.4 kg)   22 192 lb (87.1 kg)   21 187 lb (84.8 kg)     BP Readings from Last 5 Encounters:   25 130/80   25 134/88   22 130/80   21 118/86         Health Maintenance   Topic Date Due    COVID-19 Vaccine (3 -  season) 2024    HPV Vaccines (3 - Male 3-dose series) 2025    Annual Physical  2026    DTaP,Tdap,and Td Vaccines (3 - Td or Tdap) 2031    Influenza Vaccine  Completed    Annual Depression Screening  Completed    Pneumococcal Vaccine: Birth to 50yrs  Aged Out    Meningococcal B Vaccine  Aged Out       History reviewed. No pertinent past medical history.    .History reviewed. No pertinent surgical history.    Family History   Problem Relation Age of Onset    Lipids Father     No Known Problems Mother        Social History     Socioeconomic History    Marital status: Single     Spouse name: Not on file    Number of children: Not on file    Years of education: Not on file    Highest education level: Not on file   Occupational History    Not on file   Tobacco Use    Smoking status: Never    Smokeless tobacco: Never   Substance and Sexual Activity    Alcohol use: Never    Drug use: Never    Sexual activity: Not on file   Other Topics Concern    Not on file   Social History Narrative    Not on file     Social Drivers of Health     Food Insecurity: No Food Insecurity (2025)    NCSS - Food Insecurity     Worried About Running Out of Food in the Last Year: No     Ran Out of Food in the Last Year: No   Transportation Needs: No Transportation Needs  (2/5/2025)    NCSS - Transportation     Lack of Transportation: No   Stress: Not on file   Housing Stability: Not At Risk (2/5/2025)    NCSS - Housing/Utilities     Has Housing: Yes     Worried About Losing Housing: No     Unable to Get Utilities: No       Current Outpatient Medications   Medication Sig Dispense Refill    rosuvastatin 10 MG Oral Tab Take 1 tablet (10 mg total) by mouth nightly. 90 tablet 1    Fenofibrate 160 MG Oral Tab Take 1 tablet (160 mg total) by mouth daily. 90 tablet 3    omega-3-acid ethyl esters 1 g Oral Cap Take two capsules twice a day 360 capsule 3    metFORMIN  MG Oral Tablet 24 Hr Take 1 tablet (500 mg total) by mouth daily. Take with largest meal of the day. 90 tablet 1    empagliflozin (JARDIANCE) 10 MG Oral Tab Take 1 tablet (10 mg total) by mouth daily. 90 tablet 1    Cholecalciferol (VITAMIN D3) 1.25 MG (13423 UT) Oral Cap Take 50,000 Units by mouth once a week. 12 capsule 3    lisinopril 5 MG Oral Tab Take 1 tablet (5 mg total) by mouth daily. 90 tablet 3    Blood Glucose Monitoring Suppl (BLOOD GLUCOSE MONITOR SYSTEM) w/Device Does not apply Kit Pt to check blood sugar in am and 2 hrs after a meal and as needed.  Please provide covered machine, strips and lancets w lancing device. 1 kit 0    guaiFENesin  MG Oral Tablet 12 Hr Take 1 tablet (600 mg total) by mouth. (Patient not taking: Reported on 2/11/2025)      oseltamivir 75 MG Oral Cap Take 1 capsule (75 mg total) by mouth 2 (two) times daily. (Patient not taking: Reported on 2/11/2025)      Omeprazole 40 MG Oral Capsule Delayed Release Take 1 capsule (40 mg total) by mouth daily. (Patient not taking: Reported on 2/11/2025) 90 capsule 1    sodium chloride 0.65 % Nasal Solution 1 spray by Nasal route as needed for congestion. (Patient not taking: Reported on 2/11/2025) 60 mL 2       Allergies:  Allergies[1]    Physical Exam    Assessment and Plan:   Problem List Items Addressed This Visit       Elevated liver  enzymes     I recommend that you try/continue to decrease the amount of carbohydrates that you ingest (bread products, rice, pasta, potatoes, cereals, starchy vegetables and high sugar containing foods and  beverages). Also try to increase the amount of vegetables and protein that you eat daily.  Decrease the amount of processed and fast foods. Try to exercise at least 30 minutes per day, 4-5 times per week, combination of cardio and weight training.     Limit use of tylenol and alcoholic drinks    Ultrasound liver         Relevant Orders    US LIVER (CPT=76705)    Comp Metabolic Panel (14)    Mixed hyperlipidemia     Crestor 10 mg nightly  Fenofibrate 160 mg daily  Vascepa 1 gm 2 caps twice a day  I recommend that you try/continue to decrease the amount of carbohydrates that you ingest (bread products, rice, pasta, potatoes, cereals, starchy vegetables and high sugar containing foods and  beverages). Also try to increase the amount of vegetables and protein that you eat daily.  Decrease the amount of processed and fast foods. Try to exercise at least 30 minutes per day, 4-5 times per week, combination of cardio and weight training.              Relevant Medications    rosuvastatin 10 MG Oral Tab    Fenofibrate 160 MG Oral Tab    omega-3-acid ethyl esters 1 g Oral Cap    metFORMIN  MG Oral Tablet 24 Hr    Other Relevant Orders    Lipid Panel    Type 2 diabetes mellitus with hyperglycemia, without long-term current use of insulin (HCC) - Primary     Refer diabetic education  Start metformin er 500 mg daily  Start jardiance 10 mg daily  Check blood sugar in am and 2 hrs after a meal  Add lisinopril 5 mg daily  Add crestor 10 mg nightly  Follow up labs in 6 weeks and appointment in 7 weeks  Please let me know if you have any questions or concerns.            Relevant Medications    rosuvastatin 10 MG Oral Tab    Fenofibrate 160 MG Oral Tab    omega-3-acid ethyl esters 1 g Oral Cap    metFORMIN  MG Oral  Tablet 24 Hr    empagliflozin (JARDIANCE) 10 MG Oral Tab    lisinopril 5 MG Oral Tab    Blood Glucose Monitoring Suppl (BLOOD GLUCOSE MONITOR SYSTEM) w/Device Does not apply Kit    Other Relevant Orders    DIABETIC EDUCATION - INTERNAL    Comp Metabolic Panel (14)    Hemoglobin A1C    Lipid Panel    Vitamin D deficiency     Ergocalciferol 50,000 international units weekly         Relevant Medications    rosuvastatin 10 MG Oral Tab    Fenofibrate 160 MG Oral Tab    omega-3-acid ethyl esters 1 g Oral Cap    metFORMIN  MG Oral Tablet 24 Hr    empagliflozin (JARDIANCE) 10 MG Oral Tab               Discussed plan of care with pt and pt is in agreement.All questions answered. Pt to call with questions or concerns.      Encouraged to sign up for My Chart if not already registered.     Note to patient and family:  The 21st Century Cures Act makes medical notes available to patients in the interest of transparency.  However, please be advised that this is a medical document.  It is intended as a peer to peer communication.  It is written in medical language and may contain abbreviations or verbiage that are technical and unfamiliar.  It may appear blunt or direct.  Medical documents are intended to carry relevant information, facts as evident, and the clinical opinion of the practitioner.       [1] No Known Allergies

## 2025-02-24 ENCOUNTER — DIABETIC EDUCATION (OUTPATIENT)
Facility: CLINIC | Age: 25
End: 2025-02-24
Payer: COMMERCIAL

## 2025-02-24 DIAGNOSIS — E11.65 TYPE 2 DIABETES MELLITUS WITH HYPERGLYCEMIA, WITHOUT LONG-TERM CURRENT USE OF INSULIN (HCC): Primary | ICD-10-CM

## 2025-02-24 PROCEDURE — G0108 DIAB MANAGE TRN  PER INDIV: HCPCS | Performed by: DIETITIAN, REGISTERED

## 2025-02-24 RX ORDER — LANCETS
EACH MISCELLANEOUS
Qty: 100 EACH | Refills: 3 | Status: SHIPPED | OUTPATIENT
Start: 2025-02-24

## 2025-02-24 RX ORDER — BLOOD-GLUCOSE METER
KIT MISCELLANEOUS
Qty: 1 KIT | Refills: 0 | Status: SHIPPED | OUTPATIENT
Start: 2025-02-24

## 2025-02-24 NOTE — PROGRESS NOTES
Wayne Madrid  (2000) attended Diabetes Education.    Referring Provider: Natasha Barahona         Date: 2025  Start time: 3:30pm End time: 4:30pm  _______________________________________________     Mr. Madrid is a 24 year old male who presents today with learning from youtube. Avoiding sugar sweetned beverages. Avoiding salads and not eating out. Drinking lots of water.     Reason for visit: what can I eat, tired of just salads      Prev diab edu? no    Family history: Grandmother, dad has prediabetes     Assessment:     Wt Readings from Last 6 Encounters:   25 205 lb 6.4 oz   25 210 lb 4.8 oz   22 192 lb   21 187 lb            Lab Results   Component Value Date    A1C 6.8 (H) 2025    A1C 6.2 (H) 09/10/2021        Medical History: No past medical history on file.      Medications:     Current DM management:     Oral: metformin 500 mg, jardiance 10 mg        Taking correctly: Yes    Monitoring Blood Glucose:       Glucose testing at home:       Blood Glucose Meter:  Meter Brand:  Currently testing blood glucose: Yes  Frequency of testin times/day, fasting  BG results: per patient report   FB-212 mg/dl          Hypoglycemia episode: Recent hypoglycemia? yes after second week of drinking water and diet changes. At lunch feeling dizzie sister had gatorade   Hyperglycemia episodes: hypglycemia episodes? yesthirsty. Trouble breathing and feeling dizzie.     CGM report reviewed/printed and submitted to be scanned into chart.       Healthy Eating & Exercise:     Diet History:  Usually eats 2 meals/day and 1 snacks/day.      TYPICAL  FOOD  NOTES   Breakfast  skip         Lunch  salad or sandwich with multigrain bread         Dinner  skips       limiting pork and beef    Snacks  fruit, pills with yogurt          Beverages  water, cut soda          Eating out  not right            Instructed on basic diet guidelines including aiming for 3 meals/day, balancing each meal with  variety of nutrients, using plate method as a model for meals - goal of 1/2 plate non-starchy vegetables, 1/4 plate lean proteins, 1/4 plate carbohydrates and modest portions of fruit, yogurt, milk if desired.    We reviewed impact of carbohydrates, fiber, protein, and fat on glucose levels and trends. We discussed balancing meals and snacks with a combination of carbohydrates, proteins, and fiber to help stabilize glucose levels.     We discussed aiming for consistent meal times/carb amounts can help us determine if medications are correct. Reviewed carbohydrate counting, goals for meals (45 grams per meal) and snacks (15 grams per snack). Reviewed that carb counting consistently for a week or so can help regain control over glucose.    Carbohydrate counting chino recommendations provided. Carb \"Cheat Sheet\" provided for quick references.     Physical Activity:  Currently exercising: No has gym membership but has been busy    Sleep Patterns: most nights good if eats something sugary will have trouble sleeping and breathing   Stess: new diagnosis     Lifestyle & Healthy Coping:     Discussed healthy coping options, resources available, and diabetes distress. Discussed signs of diabetes distress and actions to take at that time. Discussed ways to prevent diabetes distress.       Education:     Diabetes Overview  Reviewed diabetes pathophysiology.  Discussed current A1c result and goals.   Discussed benefits of blood glucose control and blood glucose targets.  Reviewed signs, symptoms, prevention, and treatment of hyperglycemia and hypoglycemia.    Healthy Eating  Reviewed basic nutrition concepts for diabetes management.  Reviewed food groups and impact on blood glucose levels.  Discussed MyPlate Method of balancing diet.  Reviewed carbohydrate counting and label reading.    Being Active  Benefits and effect of activity on blood glucose discussed.  Reviewed when to call MD and benefits of goal  setting.    Monitoring  Reinforced glucose monitoring schedules: daily at rotating times, when feeling off   Discussed use of personal CGM and benefits.    Taking Medication  Reviewed medication use, action, timing, and side effects.   Injectables: Site selection, rotation, action, timing reviewed.     Reducing Risk  Reviewed overview of complications including: eye    Healthy Coping  Family involvement/support encouraged  Depression and diabetes reviewed.      Goals:     Practice healthful eating patterns, emphasizing a variety of nutrient dense foods in appropriate portion sizes.    Monitor carbohydrate intake with the MyPlate method.  Take medications as prescribed.  Practice carbohydrate counting and label reading.  Increase or continue physical activity of at least 150 mins, over at least 3 days a week or per doctors recommendations.   Improve glycemic control working towards an A1c of 7.0% or less  Checking blood glucose with glucose meter as prescribed by provider, varying and when feeling off times per day ( ).      Blood Glucose Goals  Blood sugar goals: less than 130 mg/dl in the morning (fasting) and less than 180 mg/dl 2 hours after meals.  Keep glucose tabs or fast acting carbohydrates with you for treatment of lows; for blood glucose levels less than 70 mg/dl, take 15 grams of fast acting carbohydrates (3-4 glucose tabs, 4 ounces of juice, or as discussed). Retest in 15 min. If not above 70 mg/dl, retreat.    Recommendations:     Patient goals:  Great job with all the changes you have made so far  Check your blood sugars when you feel weak   Aim for 45 g of carb at meals and 15 at snacks, you can eat out safely   I would recommend starting to exercise    Continue to use the Plate Method as a model for your meals:  1/2 of your plate is non-starchy vegetables, 1/4 of your plate is lean protein, 1/4 of your plate is starchy or carbohydrate foods     Check your blood sugars varying times per day and when  feeling off.  Switch off when you check between these two times:  1.) Fasting (before you eat breakfast)  2.) 2 hours after you start eating a meal (vary the meal you test after between breakfast, lunch, and dinner)     Blood glucose guidelines/goals:     ADA (American Diabetes Association):  A1c:  7% or less  Fasting Blood Glucose (before you eat breakfast):    2 Hours After a Meal:  Less than 180     Your doctor may give more specific goals for you.     Recommend follow up with diabetes educator in 3 months. Bring your glucose log book with you to your visits.     Follow up:   Future Appointments   Date Time Provider Department Center   4/2/2025  9:00 AM [facility] 2 [facility] [place]   4/23/2025  3:30 PM Daria Emmanuel RD [facility]         Patient verbalized understanding and has no further questions at this time.    Daria Emmanuel RD, , LD, CDCES

## 2025-02-25 ENCOUNTER — OFFICE VISIT (OUTPATIENT)
Dept: FAMILY MEDICINE CLINIC | Facility: CLINIC | Age: 25
End: 2025-02-25

## 2025-02-25 VITALS
HEART RATE: 70 BPM | SYSTOLIC BLOOD PRESSURE: 125 MMHG | HEIGHT: 65 IN | BODY MASS INDEX: 33.49 KG/M2 | WEIGHT: 201 LBS | DIASTOLIC BLOOD PRESSURE: 82 MMHG

## 2025-02-25 DIAGNOSIS — R74.8 ELEVATED LIVER ENZYMES: Primary | ICD-10-CM

## 2025-02-25 DIAGNOSIS — E78.2 MIXED HYPERLIPIDEMIA: ICD-10-CM

## 2025-02-25 DIAGNOSIS — E55.9 VITAMIN D DEFICIENCY: ICD-10-CM

## 2025-02-25 DIAGNOSIS — E78.1 HYPERTRIGLYCERIDEMIA: ICD-10-CM

## 2025-02-25 DIAGNOSIS — E11.9 TYPE 2 DIABETES MELLITUS WITHOUT COMPLICATION, WITHOUT LONG-TERM CURRENT USE OF INSULIN (HCC): ICD-10-CM

## 2025-02-25 PROCEDURE — 3074F SYST BP LT 130 MM HG: CPT | Performed by: PHYSICIAN ASSISTANT

## 2025-02-25 PROCEDURE — 3008F BODY MASS INDEX DOCD: CPT | Performed by: PHYSICIAN ASSISTANT

## 2025-02-25 PROCEDURE — 90471 IMMUNIZATION ADMIN: CPT | Performed by: PHYSICIAN ASSISTANT

## 2025-02-25 PROCEDURE — 99213 OFFICE O/P EST LOW 20 MIN: CPT | Performed by: PHYSICIAN ASSISTANT

## 2025-02-25 PROCEDURE — 3079F DIAST BP 80-89 MM HG: CPT | Performed by: PHYSICIAN ASSISTANT

## 2025-02-25 PROCEDURE — 90677 PCV20 VACCINE IM: CPT | Performed by: PHYSICIAN ASSISTANT

## 2025-02-25 RX ORDER — ICOSAPENT ETHYL 1 G/1
1 CAPSULE ORAL 2 TIMES DAILY
Qty: 120 CAPSULE | Refills: 2 | Status: SHIPPED | OUTPATIENT
Start: 2025-02-25 | End: 2025-03-27

## 2025-02-25 NOTE — PROGRESS NOTES
HPI:     HPI  A 24-year-old man is in the office for a follow-up. The patient is currently feeling well. The patient denies chest pain, SOB, N/V/C/D, fever, dizziness, syncope, and abdominal pain. There are no other concerns today.    Medications:     Current Outpatient Medications   Medication Sig Dispense Refill    Icosapent Ethyl (VASCEPA) 1 g Oral Cap Take 1 g by mouth in the morning and 1 g before bedtime. 120 capsule 2    rosuvastatin 10 MG Oral Tab Take 1 tablet (10 mg total) by mouth nightly. 90 tablet 1    Fenofibrate 160 MG Oral Tab Take 1 tablet (160 mg total) by mouth daily. 90 tablet 3    metFORMIN  MG Oral Tablet 24 Hr Take 1 tablet (500 mg total) by mouth daily. Take with largest meal of the day. 90 tablet 1    empagliflozin (JARDIANCE) 10 MG Oral Tab Take 1 tablet (10 mg total) by mouth daily. 90 tablet 1    Cholecalciferol (VITAMIN D3) 1.25 MG (52144 UT) Oral Cap Take 50,000 Units by mouth once a week. 12 capsule 3    lisinopril 5 MG Oral Tab Take 1 tablet (5 mg total) by mouth daily. 90 tablet 3    Omeprazole 40 MG Oral Capsule Delayed Release Take 1 capsule (40 mg total) by mouth daily. 90 capsule 1    sodium chloride 0.65 % Nasal Solution 1 spray by Nasal route as needed for congestion. 60 mL 2    Glucose Blood (CONTOUR NEXT TEST) In Vitro Strip Check 1 time per day and as needed. 100 strip 2    Blood Glucose Monitoring Suppl (CONTOUR BLOOD GLUCOSE SYSTEM) w/Device Does not apply Kit Check 1 times per day for  diabetes. May substitute if not on insurance formulary 1 kit 0    Microlet Lancets Does not apply Misc Check once daily. May substitute if not on insurance formulary 100 each 3    Blood Glucose Monitoring Suppl (BLOOD GLUCOSE MONITOR SYSTEM) w/Device Does not apply Kit Pt to check blood sugar in am and 2 hrs after a meal and as needed.  Please provide covered machine, strips and lancets w lancing device. 1 kit 0       Allergies:   Allergies[1]    History:     Health Maintenance    Topic Date Due    Diabetes Care Dilated Eye Exam  Never done    Pneumococcal Vaccine: Birth to 50yrs (1 of 2 - PCV) Never done    COVID-19 Vaccine (3 - 2024-25 season) 09/01/2024    Diabetes Care: Foot Exam (Annual)  Never done    HPV Vaccines (3 - Male 3-dose series) 04/30/2025    Diabetes Care A1C  08/05/2025    Annual Physical  02/05/2026    Diabetes Care: GFR  02/05/2026    DTaP,Tdap,and Td Vaccines (3 - Td or Tdap) 08/20/2031    Influenza Vaccine  Completed    Annual Depression Screening  Completed    Diabetes Care: Microalb/Creat Ratio (Annual)  Completed    Meningococcal B Vaccine  Aged Out       No LMP for male patient.   Past Medical History:   History reviewed. No pertinent past medical history.    Past Surgical History:   History reviewed. No pertinent surgical history.    Family History:     Family History   Problem Relation Age of Onset    Lipids Father     No Known Problems Mother        Social History:     Social History     Socioeconomic History    Marital status: Single     Spouse name: Not on file    Number of children: Not on file    Years of education: Not on file    Highest education level: Not on file   Occupational History    Not on file   Tobacco Use    Smoking status: Never    Smokeless tobacco: Never   Substance and Sexual Activity    Alcohol use: Never    Drug use: Never    Sexual activity: Not on file   Other Topics Concern    Not on file   Social History Narrative    Not on file     Social Drivers of Health     Food Insecurity: No Food Insecurity (2/5/2025)    NCSS - Food Insecurity     Worried About Running Out of Food in the Last Year: No     Ran Out of Food in the Last Year: No   Transportation Needs: No Transportation Needs (2/5/2025)    NCSS - Transportation     Lack of Transportation: No   Stress: Not on file   Housing Stability: Not At Risk (2/5/2025)    NCSS - Housing/Utilities     Has Housing: Yes     Worried About Losing Housing: No     Unable to Get Utilities: No        Review of Systems:   Review of Systems   Constitutional:  Negative for activity change, chills, fatigue and fever.   HENT:  Negative for congestion, ear discharge, ear pain, postnasal drip, rhinorrhea, sinus pressure, sinus pain and sore throat.    Respiratory:  Negative for cough, chest tightness, shortness of breath and wheezing.    Cardiovascular:  Negative for chest pain and palpitations.   Gastrointestinal:  Negative for abdominal distention, abdominal pain, blood in stool, constipation, diarrhea, nausea and vomiting.   Skin:  Negative for rash.        Vitals:    02/25/25 1746   BP: 125/82   Pulse: 70   Weight: 201 lb (91.2 kg)   Height: 5' 5\" (1.651 m)     Body mass index is 33.45 kg/m².    Physical Exam:   Physical Exam  Vitals reviewed.   Cardiovascular:      Rate and Rhythm: Normal rate and regular rhythm.      Heart sounds: Normal heart sounds.   Pulmonary:      Effort: Pulmonary effort is normal.      Breath sounds: Normal breath sounds.          Assessment and Plan::     Assessment & Plan  Type 2 diabetes mellitus without complication, without long-term current use of insulin ([facility])    Orders:    Ophthalmology Referral - In Network    Diabetic Test Strips and Supplies  Bilateral barefoot skin diabetic exam is normal, visualized feet and the appearance is normal.  Bilateral monofilament/sensation of both feet is normal.  Pulsation pedal pulse exam of both lower legs/feet is normal as well.    Diabetes: A1c is 6.8 done 2/5/2025 which shows Excellent control. Continue current meds and lifestyle modification.   DM Meds: empagliflozin Tabs - 10 MG; metFORMIN ER Tb24 - 500 MG   Oral Diabetes Med Adherence Excellent at 100%    Diabetic Complications: Hypertriglyceridemia: 500, 2/5/2025.  MicroAlbuminuria: 76.7 mg/dL  Proteinuria: 76.7 mg/dL         Mixed hyperlipidemia  Discussed lab results with the patient. Continue with Crestor 10 mg at bedtime.       Elevated liver enzymes    Orders:    US ABDOMEN  LIMITED (CPT=76705); Future    Vitamin D deficiency  Continue with Vitamin D 32927 I U qweek       Hypertriglyceridemia    Orders:    Icosapent Ethyl (VASCEPA) 1 g Oral Cap; Take 1 g by mouth in the morning and 1 g before bedtime.       Discussed plan of care with pt and pt is in agreement.All questions answered. Pt to call with questions or concerns.         [1] No Known Allergies

## 2025-02-26 PROBLEM — Z23 IMMUNIZATION DUE: Status: RESOLVED | Noted: 2025-02-05 | Resolved: 2025-02-26

## 2025-02-26 PROBLEM — E78.1 HYPERTRIGLYCERIDEMIA: Status: ACTIVE | Noted: 2025-02-26

## 2025-02-26 PROBLEM — Z00.00 WELL ADULT EXAM: Status: RESOLVED | Noted: 2025-02-05 | Resolved: 2025-02-26

## 2025-02-26 PROBLEM — E11.65 TYPE 2 DIABETES MELLITUS WITH HYPERGLYCEMIA, WITHOUT LONG-TERM CURRENT USE OF INSULIN (HCC): Status: RESOLVED | Noted: 2025-02-11 | Resolved: 2025-02-26

## 2025-02-26 PROBLEM — E11.9 TYPE 2 DIABETES MELLITUS WITHOUT COMPLICATION, WITHOUT LONG-TERM CURRENT USE OF INSULIN (HCC): Status: ACTIVE | Noted: 2025-02-26

## 2025-02-26 NOTE — ASSESSMENT & PLAN NOTE
Orders:    Ophthalmology Referral - In Network    Diabetic Test Strips and Supplies  Bilateral barefoot skin diabetic exam is normal, visualized feet and the appearance is normal.  Bilateral monofilament/sensation of both feet is normal.  Pulsation pedal pulse exam of both lower legs/feet is normal as well.    Diabetes: A1c is 6.8 done 2/5/2025 which shows Excellent control. Continue current meds and lifestyle modification.   DM Meds: empagliflozin Tabs - 10 MG; metFORMIN ER Tb24 - 500 MG   Oral Diabetes Med Adherence Excellent at 100%    Diabetic Complications: Hypertriglyceridemia: 500, 2/5/2025.  MicroAlbuminuria: 76.7 mg/dL  Proteinuria: 76.7 mg/dL

## 2025-02-26 NOTE — ASSESSMENT & PLAN NOTE
Orders:    Icosapent Ethyl (VASCEPA) 1 g Oral Cap; Take 1 g by mouth in the morning and 1 g before bedtime.

## 2025-02-27 NOTE — PATIENT INSTRUCTIONS
It was great meeting with you today! I am so grateful we were able to meet and talk about your diabetes!  Great job with all the changes you have made so far  Check your blood sugars when you feel weak   Aim for 45 g of carb at meals and 15 at snacks, you can eat out safely   I would recommend starting to exercise      Blood Glucose Goals  Between  mg/dl in the morning  Less than 180 mg/dl 2 hours after meals.  (You and your doctor might have discussed more specific goals)    Treating low blood sugars and high blood sugars  Always check your blood sugar if you feel any symptoms of a low blood sugar.      If you have a low blood sugar (less than 70 mg/dL) follow the \"Rule of 15\" to treat it:  1.) Take 15 grams of a quick acting carbohydrate (1 tablespoon of honey, 3-4 glucose tablets, 1/2 cup fruit juice, 1/2 can regular soda, or to-go pouch of applesauce). Only eat ONE 15g SERVING of a quick acting carbohydrate.  2.) Then check your blood sugar again after 15 minutes of drinking or eating the quick acting carbohydrate to be sure your blood sugar is above 70 mg/dL.   3.) If your blood sugar is still less than 70 mg/dL, repeat treatment of 15 grams of a quick acting carbohydrate (1 tablespoon of honey, 3-4 glucose tablets, 1/2 cup fruit juice, 1/2 can regular soda, or to-go pouch  of applesauce).  4.) If your next meal is more than one hour away, eat a small snack. Try to have some protein and complex carbohydrate (peanut butter crackers, pretzels and cheese, etc).   5.) If you are not sure what caused your low blood sugar, call your healthcare provider.  6.) Always check your blood sugar before you drive.     _______________________________________________       To treat a low, we recommend you carry with you easy, pre-portioned treatment for low blood sugars that are 15G of carbs:   - Children sized squeeze pouch applesauce (high fiber + carbs help prevent too high of a spike)  - Small children's sized  juicebox- 15g carb --> 4oz juice box  - Glucose tablets from [facility], you can find them near diabetes supplies --> Note, you will need to eat 3-4 tablets to get to 15g of carbs  - Children sized fruit snack pack- look for one with 15 grams of total carbohydrate     AVOID complex carbs, or foods that contain fats along with carbs (like chocolate) can slow the absorption of glucose and should NOT be used to treat an emergency low.    You are doing amazing! Keep up the great work!    Daria Emmanuel, RD, , LD, CDCES(she/her/hers)  Diabetes Educator  [facility] [place]  [email or web address]  [phone] phone  [phone] Fax

## 2025-04-09 ENCOUNTER — HOSPITAL ENCOUNTER (OUTPATIENT)
Dept: ULTRASOUND IMAGING | Age: 25
Discharge: HOME | End: 2025-04-09
Attending: PHYSICIAN ASSISTANT
Payer: COMMERCIAL

## 2025-04-09 DIAGNOSIS — R74.8 ELEVATED LIVER ENZYMES: ICD-10-CM

## 2025-04-09 PROCEDURE — 76705 ECHO EXAM OF ABDOMEN: CPT | Performed by: PHYSICIAN ASSISTANT

## (undated) NOTE — LETTER
2/11/2025              Wayne Madrid        [address] [place] [id number]         To Whom It May Concern:    Wayne is currently under my medical care. Due to one of his medications, he needs to be drinking 8-100 oz water per day. Please allow appropriate accomodations to provide for him to achieve this goal.     Please janie the office if you have any questions.     Sincerely,      ROLANDO Hair          Document electronically generated by:  ROLANDO Hair